# Patient Record
Sex: FEMALE | Race: ASIAN | NOT HISPANIC OR LATINO | URBAN - METROPOLITAN AREA
[De-identification: names, ages, dates, MRNs, and addresses within clinical notes are randomized per-mention and may not be internally consistent; named-entity substitution may affect disease eponyms.]

---

## 2017-06-06 ENCOUNTER — APPOINTMENT (OUTPATIENT)
Age: 22
Setting detail: DERMATOLOGY
End: 2017-06-07

## 2017-06-06 DIAGNOSIS — L70.0 ACNE VULGARIS: ICD-10-CM

## 2017-06-06 DIAGNOSIS — Z79.899 OTHER LONG TERM (CURRENT) DRUG THERAPY: ICD-10-CM

## 2017-06-06 DIAGNOSIS — L90.5 SCAR CONDITIONS AND FIBROSIS OF SKIN: ICD-10-CM

## 2017-06-06 PROCEDURE — OTHER PRESCRIPTION: OTHER

## 2017-06-06 PROCEDURE — 99213 OFFICE O/P EST LOW 20 MIN: CPT

## 2017-06-06 PROCEDURE — OTHER HIGH RISK MEDICATION MONITORING: OTHER

## 2017-06-06 PROCEDURE — OTHER ORDER TESTS: OTHER

## 2017-06-06 PROCEDURE — OTHER COUNSELING: OTHER

## 2017-06-06 PROCEDURE — OTHER TREATMENT REGIMEN: OTHER

## 2017-06-06 PROCEDURE — OTHER ISOTRETINOIN INITIATION: OTHER

## 2017-06-06 PROCEDURE — OTHER OTHER: OTHER

## 2017-06-06 RX ORDER — SPIRONOLACTONE 100 MG/1
TABLET, FILM COATED ORAL
Qty: 30 | Refills: 3 | Status: ERX | COMMUNITY
Start: 2017-06-06

## 2017-06-06 RX ORDER — DROSPIRENONE AND ETHINYL ESTRADIOL 0.02-3(28)
KIT ORAL
Qty: 1 | Refills: 11 | Status: ERX | COMMUNITY
Start: 2017-06-06

## 2017-06-06 ASSESSMENT — LOCATION DETAILED DESCRIPTION DERM
LOCATION DETAILED: LEFT MEDIAL FOREHEAD
LOCATION DETAILED: RIGHT INFERIOR LATERAL MALAR CHEEK
LOCATION DETAILED: LEFT INFERIOR CENTRAL MALAR CHEEK

## 2017-06-06 ASSESSMENT — LOCATION SIMPLE DESCRIPTION DERM
LOCATION SIMPLE: LEFT FOREHEAD
LOCATION SIMPLE: LEFT CHEEK
LOCATION SIMPLE: RIGHT CHEEK

## 2017-06-06 ASSESSMENT — LOCATION ZONE DERM: LOCATION ZONE: FACE

## 2017-06-06 NOTE — PROCEDURE: OTHER
Other (Free Text): spironolactone not totally controlling acne\\ndenies hx of depression, inflammatory bowel disease\\nentire family has had cystic acne and taken isotretinoin\\nno hx of clotting\\n\\npt goes to med school in [place]\\nwill be here for summer\\nagrees to get labs done monthly in [place]
Note Text (......Xxx Chief Complaint.): This diagnosis correlates with the
Detail Level: Detailed

## 2017-06-06 NOTE — PROCEDURE: TREATMENT REGIMEN
Plan isotretinoin course pending nl labs.  Starting dose will be 20mg qd.  All consent forms received

## 2017-06-09 ENCOUNTER — RX ONLY (RX ONLY)
Age: 22
End: 2017-06-09

## 2017-06-09 RX ORDER — NORETHINDRONE ACETATE AND ETHINYL ESTRADIOL AND FERROUS FUMARATE 1MG-20(21)
KIT ORAL
Qty: 1 | Refills: 11 | Status: ERX | COMMUNITY
Start: 2017-06-09

## 2017-07-10 ENCOUNTER — RX ONLY (RX ONLY)
Age: 22
End: 2017-07-10

## 2017-07-10 RX ORDER — ISOTRETINOIN 20 MG/1
CAPSULE, LIQUID FILLED ORAL
Qty: 30 | Refills: 0 | Status: ERX | COMMUNITY
Start: 2017-07-10

## 2017-08-10 ENCOUNTER — APPOINTMENT (OUTPATIENT)
Age: 22
Setting detail: DERMATOLOGY
End: 2017-08-21

## 2017-08-10 DIAGNOSIS — L90.5 SCAR CONDITIONS AND FIBROSIS OF SKIN: ICD-10-CM

## 2017-08-10 DIAGNOSIS — L70.0 ACNE VULGARIS: ICD-10-CM

## 2017-08-10 DIAGNOSIS — Z79.899 OTHER LONG TERM (CURRENT) DRUG THERAPY: ICD-10-CM

## 2017-08-10 PROCEDURE — 99213 OFFICE O/P EST LOW 20 MIN: CPT

## 2017-08-10 PROCEDURE — OTHER ISOTRETINOIN MONITORING: OTHER

## 2017-08-10 PROCEDURE — OTHER PRESCRIPTION: OTHER

## 2017-08-10 PROCEDURE — OTHER COUNSELING: OTHER

## 2017-08-10 PROCEDURE — OTHER HIGH RISK MEDICATION MONITORING: OTHER

## 2017-08-10 PROCEDURE — OTHER ORDER TESTS: OTHER

## 2017-08-10 PROCEDURE — OTHER OTHER: OTHER

## 2017-08-10 RX ORDER — ISOTRETINOIN 20 MG/1
CAPSULE, LIQUID FILLED ORAL
Qty: 90 | Refills: 0 | Status: ERX

## 2017-08-10 ASSESSMENT — LOCATION DETAILED DESCRIPTION DERM
LOCATION DETAILED: LEFT INFERIOR CENTRAL MALAR CHEEK
LOCATION DETAILED: LEFT MEDIAL FOREHEAD
LOCATION DETAILED: RIGHT INFERIOR LATERAL MALAR CHEEK

## 2017-08-10 ASSESSMENT — LOCATION SIMPLE DESCRIPTION DERM
LOCATION SIMPLE: RIGHT CHEEK
LOCATION SIMPLE: LEFT CHEEK
LOCATION SIMPLE: LEFT FOREHEAD

## 2017-08-10 ASSESSMENT — LOCATION ZONE DERM: LOCATION ZONE: FACE

## 2017-08-10 NOTE — PROCEDURE: ISOTRETINOIN MONITORING
Completed Therapy?: No
Pounds Preamble Statement (Weight Entered In Details Tab): Reported Weight in pounds:
Dosing Month 1 (Required For Cumulative Dosing): 20mg Daily
Detail Level: Zone
Female Completion Statement: After discussing her treatment course we decided to discontinue isotretinoin therapy at this time. I explained that she would need to continue her birth control methods for at least one month after the last dosage. She should also get a pregnancy test one month after the last dose. She shouldn't donate blood for one month after the last dose. She should call with any new symptoms of depression.
Weight Units: pounds
Male Completion Statement: After discussing his treatment course we decided to discontinue isotretinoin therapy at this time. He shouldn't donate blood for one month after the last dose. He should call with any new symptoms of depression.
Are Labs Available For Review?: Yes
Months Of Therapy Completed: 1
Kilograms Preamble Statement (Weight Entered In Details Tab): Reported Weight in kilograms:

## 2017-08-10 NOTE — PROCEDURE: OTHER
Other (Free Text): Identification Number:  [id number]\\nFirst Name:  Maria Luisa\\nLast Name:  Wilian\\nDate of Birth:  11/12/1995\\nConfirm Patient Counseling is Complete.\\nThe patient's Program Status is Required to Demonstrate Comprehension.\\nThe patient must answer her Comprehension questions, and then may have her prescription filled before 11:59 PM Eastern Time on 8/15/2017.\\n Other (Free Text): Identification Number:  [id number]\\nFirst Name:  Maria Luisa\\nLast Name:  Wilian\\nDate of Birth:  11/12/1995\\nConfirm Patient Counseling is Complete.\\nThe patient's Program Status is Required to Demonstrate Comprehension.\\nThe patient must answer her Comprehension questions, and then may have her prescription filled before 11:59 PM Eastern Time on 8/15/2017.\\n

## 2017-09-26 ENCOUNTER — RX ONLY (RX ONLY)
Age: 22
End: 2017-09-26

## 2017-09-26 RX ORDER — ISOTRETINOIN 20 MG/1
CAPSULE, LIQUID FILLED ORAL
Qty: 30 | Refills: 0 | Status: ERX

## 2017-11-07 ENCOUNTER — RX ONLY (RX ONLY)
Age: 22
End: 2017-11-07

## 2017-11-07 RX ORDER — ISOTRETINOIN 30 MG/1
CAPSULE, LIQUID FILLED ORAL
Qty: 60 | Refills: 0 | Status: ERX | COMMUNITY
Start: 2017-11-07

## 2017-12-22 ENCOUNTER — RX ONLY (RX ONLY)
Age: 22
End: 2017-12-22

## 2017-12-22 RX ORDER — ISOTRETINOIN 20 MG/1
CAPSULE, LIQUID FILLED ORAL
Qty: 90 | Refills: 0 | Status: ERX

## 2018-02-22 ENCOUNTER — APPOINTMENT (OUTPATIENT)
Age: 23
Setting detail: DERMATOLOGY
End: 2018-02-22

## 2018-02-22 DIAGNOSIS — L70.0 ACNE VULGARIS: ICD-10-CM

## 2018-02-22 PROCEDURE — OTHER ISOTRETINOIN MONITORING: OTHER

## 2018-02-22 NOTE — PROCEDURE: ISOTRETINOIN MONITORING
Dosing Month 4 (Required For Cumulative Dosing): 60mg Daily
Detail Level: Zone
Female Completion Statement: After discussing her treatment course we decided to discontinue isotretinoin therapy at this time. I explained that she would need to continue her birth control methods for at least one month after the last dosage. She should also get a pregnancy test one month after the last dose. She shouldn't donate blood for one month after the last dose. She should call with any new symptoms of depression.
Completed Therapy?: No
Dosing Month 3 (Required For Cumulative Dosing): 20mg Daily
Patient Weight (Optional But Required For Cumulative Dose-Numbers And Decimals Only): 125
Display Individual Monthly Dosage In The Note (If Yes Will Display All Dosages Which Are Not N/A): yes
Pounds Preamble Statement (Weight Entered In Details Tab): Reported Weight in pounds: 170
Male Completion Statement: After discussing his treatment course we decided to discontinue isotretinoin therapy at this time. He shouldn't donate blood for one month after the last dose. He should call with any new symptoms of depression.
Months Of Therapy Completed: 6
Weight Units: pounds
Kilograms Preamble Statement (Weight Entered In Details Tab): Reported Weight in kilograms: